# Patient Record
Sex: MALE | Race: WHITE | NOT HISPANIC OR LATINO | URBAN - METROPOLITAN AREA
[De-identification: names, ages, dates, MRNs, and addresses within clinical notes are randomized per-mention and may not be internally consistent; named-entity substitution may affect disease eponyms.]

---

## 2021-01-04 ENCOUNTER — TELEPHONE ENCOUNTER (OUTPATIENT)
Dept: URBAN - METROPOLITAN AREA SURGERY CENTER 8 | Facility: SURGERY CENTER | Age: 56
End: 2021-01-04

## 2021-01-04 ENCOUNTER — OFFICE VISIT (OUTPATIENT)
Dept: URBAN - METROPOLITAN AREA CLINIC 35 | Facility: CLINIC | Age: 56
End: 2021-01-04

## 2021-01-04 VITALS
WEIGHT: 200 LBS | SYSTOLIC BLOOD PRESSURE: 140 MMHG | DIASTOLIC BLOOD PRESSURE: 90 MMHG | OXYGEN SATURATION: 97 % | HEIGHT: 74 IN | BODY MASS INDEX: 25.67 KG/M2 | TEMPERATURE: 98.1 F | HEART RATE: 84 BPM

## 2021-01-04 PROBLEM — 428283002 HISTORY OF POLYP OF COLON (SITUATION): Status: ACTIVE | Noted: 2021-01-04

## 2021-01-04 RX ORDER — SODIUM PICOSULFATE, MAGNESIUM OXIDE, AND ANHYDROUS CITRIC ACID 10; 3.5; 12 MG/160ML; G/160ML; G/160ML
160 ML LIQUID ORAL
Qty: 1 KIT | Refills: 0 | OUTPATIENT
Start: 2021-01-04

## 2021-01-04 RX ORDER — MELOXICAM 15 MG/1
1 TABLET TABLET ORAL ONCE A DAY
Qty: 30 | Status: ACTIVE | COMMUNITY

## 2021-01-04 RX ORDER — ERGOCALCIFEROL 1.25 MG/1
1 CAPSULE CAPSULE ORAL
Status: ON HOLD | COMMUNITY

## 2021-01-04 RX ORDER — LYSINE HCL 500 MG
TABLET ORAL
Status: ACTIVE | COMMUNITY

## 2021-01-04 RX ORDER — SODIUM PICOSULFATE, MAGNESIUM OXIDE, AND ANHYDROUS CITRIC ACID 10; 3.5; 12 MG/16.1G; G/16.1G; G/16.1G
AS DIRECTED POWDER, METERED ORAL 1
Qty: 1 | Refills: 0 | Status: DISCONTINUED | COMMUNITY
Start: 2015-12-16

## 2021-01-04 RX ORDER — VITAMIN D 25 MCG
1 TABLET TAB ORAL ONCE A DAY
Status: ON HOLD | COMMUNITY

## 2021-01-04 NOTE — HPI-MIGRATED HPI
;     Surveillance Colonoscopy : Patient is a 55-year-old  male who presents today for a surveillance colonoscopy. Patient's last colonoscopy performed on 02/15/2016 with Dr. Collins revealed diverticulosis, hemorrhoids, benign polyp x2, multiple hyperplastic polyps, tubulovillous adenoma, tubular adenoma x1. Admits a family history of colon cancer. Patient denies a family history of gastric/esophageal cancer/polyps. Patient currently admits 1 bowel movement a day. Stools are typically thinner than normal. Patient denies melena, blood, or mucus in stool, heartburn, nausea, vomiting, diarrhea, or constipation. ;

## 2021-02-22 ENCOUNTER — OFFICE VISIT (OUTPATIENT)
Dept: URBAN - METROPOLITAN AREA SURGERY CENTER 8 | Facility: SURGERY CENTER | Age: 56
End: 2021-02-22

## 2021-03-15 ENCOUNTER — DASHBOARD ENCOUNTERS (OUTPATIENT)
Age: 56
End: 2021-03-15

## 2021-03-15 ENCOUNTER — OFFICE VISIT (OUTPATIENT)
Dept: URBAN - METROPOLITAN AREA CLINIC 35 | Facility: CLINIC | Age: 56
End: 2021-03-15

## 2021-03-15 VITALS
DIASTOLIC BLOOD PRESSURE: 78 MMHG | SYSTOLIC BLOOD PRESSURE: 130 MMHG | OXYGEN SATURATION: 97 % | BODY MASS INDEX: 26.31 KG/M2 | TEMPERATURE: 98.7 F | HEIGHT: 74 IN | WEIGHT: 205 LBS | HEART RATE: 79 BPM

## 2021-03-15 PROBLEM — 398050005 DIVERTICULAR DISEASE OF COLON: Status: ACTIVE | Noted: 2021-03-15

## 2021-03-15 RX ORDER — ATORVASTATIN CALCIUM 20 MG/1
TAKE 1 TABLET BY MOUTH EVERY DAY TABLET ORAL
Qty: 90 UNSPECIFIED | Refills: 1 | Status: ACTIVE | COMMUNITY

## 2021-03-15 RX ORDER — METFORMIN HYDROCHLORIDE 500 MG/1
TABLET, FILM COATED ORAL
Qty: 360 UNSPECIFIED | Refills: 0 | Status: ACTIVE | COMMUNITY

## 2021-03-15 RX ORDER — LYSINE HCL 500 MG
TABLET ORAL
Status: ACTIVE | COMMUNITY

## 2021-03-15 RX ORDER — MELOXICAM 15 MG/1
1 TABLET TABLET ORAL ONCE A DAY
Qty: 30 | Status: ACTIVE | COMMUNITY

## 2021-03-15 RX ORDER — LISINOPRIL 5 MG/1
TAKE 1 TABLET BY MOUTH EVERY DAY TABLET ORAL
Qty: 90 UNSPECIFIED | Refills: 1 | Status: ACTIVE | COMMUNITY

## 2021-03-15 RX ORDER — VITAMIN D 25 MCG
1 TABLET TAB ORAL ONCE A DAY
COMMUNITY

## 2021-03-15 RX ORDER — SODIUM PICOSULFATE, MAGNESIUM OXIDE, AND ANHYDROUS CITRIC ACID 10; 3.5; 12 MG/160ML; G/160ML; G/160ML
160 ML LIQUID ORAL
Qty: 1 KIT | Refills: 0 | COMMUNITY
Start: 2021-01-04

## 2021-03-15 RX ORDER — ERGOCALCIFEROL 1.25 MG/1
1 CAPSULE CAPSULE ORAL
COMMUNITY

## 2021-03-15 NOTE — HPI-MIGRATED HPI
;     Surveillance Colonoscopy :  Patient presents today for follow up to his colonoscopy.  His colonoscopy was completed on 02/22/2021 with Dr Collins results are noted below. He had no any complications after his procedure. Currently has 1-2 bowel movements per day.  Stools are soft without blood, mucus or melena.         Last visit (01/04/2021)  revealed diverticulosis, hemorrhoids, benign polyp x2, multiple hyperplastic polyps, tubulovillous adenoma, tubular adenoma x1. Admits a family history of colon cancer. Patient denies a family history of gastric/esophageal cancer/polyps. Patient currently admits 1 bowel movement a day. Stools are typically thinner than normal. Patient denies melena, blood, or mucus in stool, heartburn, nausea, vomiting, diarrhea, or constipation. ;